# Patient Record
Sex: MALE | Race: WHITE | Employment: FULL TIME | URBAN - METROPOLITAN AREA
[De-identification: names, ages, dates, MRNs, and addresses within clinical notes are randomized per-mention and may not be internally consistent; named-entity substitution may affect disease eponyms.]

---

## 2018-11-21 ENCOUNTER — APPOINTMENT (OUTPATIENT)
Dept: ULTRASOUND IMAGING | Age: 33
End: 2018-11-21
Attending: PHYSICIAN ASSISTANT
Payer: SELF-PAY

## 2018-11-21 ENCOUNTER — HOSPITAL ENCOUNTER (EMERGENCY)
Age: 33
Discharge: HOME | End: 2018-11-21
Attending: EMERGENCY MEDICINE
Payer: SELF-PAY

## 2018-11-21 VITALS
DIASTOLIC BLOOD PRESSURE: 65 MMHG | OXYGEN SATURATION: 98 % | TEMPERATURE: 98.1 F | WEIGHT: 145 LBS | HEIGHT: 66 IN | SYSTOLIC BLOOD PRESSURE: 117 MMHG | BODY MASS INDEX: 23.3 KG/M2 | RESPIRATION RATE: 18 BRPM | HEART RATE: 90 BPM

## 2018-11-21 DIAGNOSIS — N45.1 LEFT EPIDIDYMITIS: Primary | ICD-10-CM

## 2018-11-21 LAB
ALBUMIN SERPL-MCNC: 4.3 G/DL (ref 3.5–5)
ALBUMIN/GLOB SERPL: 1.5 {RATIO} (ref 1.2–3.5)
ALP SERPL-CCNC: 80 U/L (ref 50–136)
ALT SERPL-CCNC: 27 U/L (ref 12–65)
ANION GAP SERPL CALC-SCNC: 10 MMOL/L (ref 7–16)
AST SERPL-CCNC: 20 U/L (ref 15–37)
BASOPHILS # BLD: 0 K/UL (ref 0–0.2)
BASOPHILS NFR BLD: 0 % (ref 0–2)
BILIRUB SERPL-MCNC: 1.1 MG/DL (ref 0.2–1.1)
BUN SERPL-MCNC: 17 MG/DL (ref 6–23)
CALCIUM SERPL-MCNC: 8.9 MG/DL (ref 8.3–10.4)
CHLORIDE SERPL-SCNC: 103 MMOL/L (ref 98–107)
CO2 SERPL-SCNC: 26 MMOL/L (ref 21–32)
CREAT SERPL-MCNC: 1.17 MG/DL (ref 0.8–1.5)
DIFFERENTIAL METHOD BLD: ABNORMAL
EOSINOPHIL # BLD: 0.1 K/UL (ref 0–0.8)
EOSINOPHIL NFR BLD: 1 % (ref 0.5–7.8)
ERYTHROCYTE [DISTWIDTH] IN BLOOD BY AUTOMATED COUNT: 12.1 %
GLOBULIN SER CALC-MCNC: 2.9 G/DL (ref 2.3–3.5)
GLUCOSE SERPL-MCNC: 88 MG/DL (ref 65–100)
HCT VFR BLD AUTO: 45.4 % (ref 41.1–50.3)
HGB BLD-MCNC: 16 G/DL (ref 13.6–17.2)
IMM GRANULOCYTES # BLD: 0 K/UL (ref 0–0.5)
IMM GRANULOCYTES NFR BLD AUTO: 0 % (ref 0–5)
LYMPHOCYTES # BLD: 1.1 K/UL (ref 0.5–4.6)
LYMPHOCYTES NFR BLD: 13 % (ref 13–44)
MCH RBC QN AUTO: 30.9 PG (ref 26.1–32.9)
MCHC RBC AUTO-ENTMCNC: 35.2 G/DL (ref 31.4–35)
MCV RBC AUTO: 87.8 FL (ref 79.6–97.8)
MONOCYTES # BLD: 0.5 K/UL (ref 0.1–1.3)
MONOCYTES NFR BLD: 6 % (ref 4–12)
NEUTS SEG # BLD: 6.4 K/UL (ref 1.7–8.2)
NEUTS SEG NFR BLD: 80 % (ref 43–78)
NRBC # BLD: 0 K/UL (ref 0–0.2)
PLATELET # BLD AUTO: 212 K/UL (ref 150–450)
PMV BLD AUTO: 11.2 FL (ref 9.4–12.3)
POTASSIUM SERPL-SCNC: 3.6 MMOL/L (ref 3.5–5.1)
PROT SERPL-MCNC: 7.2 G/DL (ref 6.3–8.2)
RBC # BLD AUTO: 5.17 M/UL (ref 4.23–5.6)
SODIUM SERPL-SCNC: 139 MMOL/L (ref 136–145)
WBC # BLD AUTO: 8 K/UL (ref 4.3–11.1)

## 2018-11-21 PROCEDURE — 76870 US EXAM SCROTUM: CPT

## 2018-11-21 PROCEDURE — 87086 URINE CULTURE/COLONY COUNT: CPT

## 2018-11-21 PROCEDURE — 74011250637 HC RX REV CODE- 250/637: Performed by: PHYSICIAN ASSISTANT

## 2018-11-21 PROCEDURE — 99283 EMERGENCY DEPT VISIT LOW MDM: CPT | Performed by: PHYSICIAN ASSISTANT

## 2018-11-21 PROCEDURE — 85025 COMPLETE CBC W/AUTO DIFF WBC: CPT

## 2018-11-21 PROCEDURE — 80053 COMPREHEN METABOLIC PANEL: CPT

## 2018-11-21 RX ORDER — IBUPROFEN 800 MG/1
800 TABLET ORAL
Qty: 20 TAB | Refills: 0 | Status: SHIPPED | OUTPATIENT
Start: 2018-11-21

## 2018-11-21 RX ORDER — LEVOFLOXACIN 500 MG/1
500 TABLET, FILM COATED ORAL DAILY
Qty: 10 TAB | Refills: 0 | Status: SHIPPED | OUTPATIENT
Start: 2018-11-21 | End: 2018-12-01

## 2018-11-21 RX ORDER — IBUPROFEN 800 MG/1
800 TABLET ORAL
Status: COMPLETED | OUTPATIENT
Start: 2018-11-21 | End: 2018-11-21

## 2018-11-21 RX ADMIN — IBUPROFEN 800 MG: 800 TABLET, FILM COATED ORAL at 12:17

## 2018-11-21 NOTE — DISCHARGE INSTRUCTIONS
Take levofloxacin as directed. Take all this medication as directed until gone this is very important. You will be called if you test positive for gonorrhea or chlamydia. Follow-up and establish care with either the [facility] or Dr. Penaloza. You will need to be retested to ensure that your urine clears of blood. If you have no improvement after 10 days of antibiotics, call Dr. Jose Cruz Yuen, Urologist for follow-up. Return to the ER if pain worsens, is not treated with medication or new symptoms.

## 2018-11-21 NOTE — ED PROVIDER NOTES
Patient present to the ER for evaluation of dysuria at his urethra and left testicular pain that radiates into his abdomen for the past 2 weeks. No penile discharge, no rash. Denies history of sexually transmitted infections. Was seen by [facility] 10/28, was given doxycycline of which he took all. Never received treatment for GC or C in ER. Patient endorses some improvement initially after the doxy, but symptoms returned. Denies abd pain, pain with defecation, no fever, nausea or vomiting. Patient admits to smoking 1 pack per day. Works as a , admits to lifting very heavy things throughout the day. Scrotal pain is worse when he has to lift heavy things. Patient states he drinks approximately 2-3 sodas per week, and does not drink enough water. History reviewed. No pertinent past medical history. History reviewed. No pertinent surgical history. History reviewed. No pertinent family history. Social History Socioeconomic History  Marital status:  Spouse name: Not on file  Number of children: Not on file  Years of education: Not on file  Highest education level: Not on file Social Needs  Financial resource strain: Not on file  Food insecurity - worry: Not on file  Food insecurity - inability: Not on file  Transportation needs - medical: Not on file  Transportation needs - non-medical: Not on file Occupational History  Not on file Tobacco Use  Smoking status: Not on file Substance and Sexual Activity  Alcohol use: Not on file  Drug use: Not on file  Sexual activity: Not on file Other Topics Concern  Not on file Social History Narrative  Not on file ALLERGIES: Patient has no known allergies. Review of Systems Constitutional: Negative for chills, diaphoresis, fatigue and fever. Respiratory: Negative for shortness of breath, wheezing and stridor. Cardiovascular: Negative for chest pain, palpitations and leg swelling. Genitourinary: Positive for difficulty urinating, dysuria and testicular pain. Negative for discharge, frequency, penile pain, penile swelling, scrotal swelling and urgency. Musculoskeletal: Negative for neck pain and neck stiffness. Neurological: Negative for dizziness, facial asymmetry and headaches. Vitals:  
 11/21/18 2059 BP: 117/65 Pulse: 90 Resp: 18 Temp: 98.1 °F (36.7 °C) SpO2: 98% Weight: 65.8 kg (145 lb) Height: 5' 6\" (1.676 m) Physical Exam  
Constitutional: He appears well-developed and well-nourished. Thin, disheveled but healthy appearing male in no acute distress whatsoever. Able to ambulate and move all limbs without pain HENT:  
Head: Normocephalic. Eyes: Conjunctivae and EOM are normal. Pupils are equal, round, and reactive to light. Abdominal: Soft. Bowel sounds are normal. He exhibits no distension and no mass. There is no tenderness. There is no rebound and no guarding. Hernia confirmed negative in the right inguinal area and confirmed negative in the left inguinal area. Genitourinary: Prostate normal. Rectal exam shows no external hemorrhoid, no internal hemorrhoid, no fissure, no tenderness and anal tone normal. Prostate is not enlarged and not tender. Right testis is descended. Cremasteric reflex is not absent on the right side. Left testis shows tenderness. Left testis shows no mass and no swelling. Left testis is descended. Cremasteric reflex is not absent on the left side. Circumcised. No hypospadias, penile erythema or penile tenderness. No discharge found. Genitourinary Comments: No appreciable hernia, moderate tenderness to palpation along lateral and posterior edge of left testicle consistent with epididymitis. No warmth or swelling. No edema. Prostate appreciated as normal, nttp. Nursing note and vitals reviewed.  
  
 
MDM 
 Number of Diagnoses or Management Options Left epididymitis: new and requires workup Diagnosis management comments: Epididymitis, prostatitis, testicular mass, UTI, STI, noninfectious acute vs. chronic epididymitis Amount and/or Complexity of Data Reviewed Clinical lab tests: ordered and reviewed Tests in the radiology section of CPT®: ordered and reviewed Review and summarize past medical records: yes (Record review from 10/28/18 shows patient was seen by [facility], negative GC and chlamydia, negative urine culture. Was given doxycycline by mouth for 10 days for STIsuspected epididymitis. Was not treated with Rocephin in the ED.) Risk of Complications, Morbidity, and/or Mortality Presenting problems: moderate Diagnostic procedures: moderate Management options: moderate General comments: Will evaluate kidney functions with CMP, patient unable to give urine at this time. Patient was given 2 glasses of water and encouraged to drink. We will also evaluate for testicular torsion or mass with ultrasound. 11:36 AM 
Urine clean of infection, but does show hematuria. ultrasound shows no abnormalities or source of pain. Given patient's smoking history and hematuria, there is a concern for bladder cancer. I will encourage patient to follow up and establish care with the [facility]. Will also refer patient to urology. 12:14 PM 
Prostate nontender to palpation, suspect non-STD related epididymitis. Up to date shows levofloxacin 500 mg ×10 days is the treatment. Patient also encouraged to use scrotal support and Motrin 800 mg. Gave patient information for [facility] clinic,&  family practice on-call to establish care, and urologist if no improvement. Patient Progress Patient progress: stable Procedures The patient was observed in the ED. Results Reviewed: 
 
 
Recent Results (from the past 24 hour(s)) CBC WITH AUTOMATED DIFF  
 Collection Time: 11/21/18  9:58 AM  
Result Value Ref Range WBC 8.0 4.3 - 11.1 K/uL  
 RBC 5.17 4.23 - 5.6 M/uL  
 HGB 16.0 13.6 - 17.2 g/dL HCT 45.4 41.1 - 50.3 % MCV 87.8 79.6 - 97.8 FL  
 MCH 30.9 26.1 - 32.9 PG  
 MCHC 35.2 (H) 31.4 - 35.0 g/dL  
 RDW 12.1 % PLATELET 151 146 - 816 K/uL MPV 11.2 9.4 - 12.3 FL ABSOLUTE NRBC 0.00 0.0 - 0.2 K/uL  
 DF AUTOMATED NEUTROPHILS 80 (H) 43 - 78 % LYMPHOCYTES 13 13 - 44 % MONOCYTES 6 4.0 - 12.0 % EOSINOPHILS 1 0.5 - 7.8 % BASOPHILS 0 0.0 - 2.0 % IMMATURE GRANULOCYTES 0 0.0 - 5.0 %  
 ABS. NEUTROPHILS 6.4 1.7 - 8.2 K/UL  
 ABS. LYMPHOCYTES 1.1 0.5 - 4.6 K/UL  
 ABS. MONOCYTES 0.5 0.1 - 1.3 K/UL  
 ABS. EOSINOPHILS 0.1 0.0 - 0.8 K/UL  
 ABS. BASOPHILS 0.0 0.0 - 0.2 K/UL  
 ABS. IMM. GRANS. 0.0 0.0 - 0.5 K/UL METABOLIC PANEL, COMPREHENSIVE Collection Time: 11/21/18  9:58 AM  
Result Value Ref Range Sodium 139 136 - 145 mmol/L Potassium 3.6 3.5 - 5.1 mmol/L Chloride 103 98 - 107 mmol/L  
 CO2 26 21 - 32 mmol/L Anion gap 10 7 - 16 mmol/L Glucose 88 65 - 100 mg/dL BUN 17 6 - 23 MG/DL Creatinine 1.17 0.8 - 1.5 MG/DL  
 GFR est AA >60 >60 ml/min/1.73m2 GFR est non-AA >60 >60 ml/min/1.73m2 Calcium 8.9 8.3 - 10.4 MG/DL Bilirubin, total 1.1 0.2 - 1.1 MG/DL  
 ALT (SGPT) 27 12 - 65 U/L  
 AST (SGOT) 20 15 - 37 U/L Alk. phosphatase 80 50 - 136 U/L Protein, total 7.2 6.3 - 8.2 g/dL Albumin 4.3 3.5 - 5.0 g/dL Globulin 2.9 2.3 - 3.5 g/dL A-G Ratio 1.5 1.2 - 3.5    
 
 
US SCROTUM/TESTICLES Final Result IMPRESSION: Negative scrotal ultrasound. I discussed the results of all labs, procedures, radiographs, and treatments with the patient and available family. Treatment plan is agreed upon and the patient is ready for discharge. All voiced understanding of the discharge plan and medication instructions or changes as appropriate. Questions about treatment in the ED were answered.   All were encouraged to return should symptoms worsen or new problems develop. Voice dictation software was used during the making of this note. This software is not perfect and grammatical and other typographical errors may be present. This note has been proofread, but may still contain errors.  
RJ Shields; 11/21/2018 @12:12 PM  
===================================================================

## 2018-11-21 NOTE — ED NOTES
I have reviewed discharge instructions with the patient. The patient verbalized understanding. Patient left ED via Discharge Method: ambulatory to Home with (SELF). Opportunity for questions and clarification provided. Patient given 2 scripts. To continue your aftercare when you leave the hospital, you may receive an automated call from our care team to check in on how you are doing. This is a free service and part of our promise to provide the best care and service to meet your aftercare needs.  If you have questions, or wish to unsubscribe from this service please call [phone]. Thank you for Choosing our [facility].

## 2018-11-21 NOTE — ED TRIAGE NOTES
Pt reports having a bladder infection for weeks. Pt states he was treated and it went away and then it came back. Pt reports heavy caffeine and sugar intake. pt denies penile discharge. Burning with urination

## 2018-11-21 NOTE — LETTER
[address] [facility] DEPT [address] [id number] [phone] Work/School Note Date: 11/21/2018 To Whom It May concern: 
 
Nai Crowley was seen and treated today in the emergency room by the following provider(s): 
Attending Provider: Shawn Nixon MD 
Physician Assistant: Aurelio Pattersonma. Nai Crowley may return to work on 11/22/2018. Sincerely, RJ Hull

## 2018-11-23 LAB
BACTERIA SPEC CULT: NORMAL
SERVICE CMNT-IMP: NORMAL